# Patient Record
Sex: FEMALE | Race: WHITE | Employment: OTHER | URBAN - METROPOLITAN AREA
[De-identification: names, ages, dates, MRNs, and addresses within clinical notes are randomized per-mention and may not be internally consistent; named-entity substitution may affect disease eponyms.]

---

## 2023-05-08 ENCOUNTER — APPOINTMENT (OUTPATIENT)
Dept: PRIMARY CARE | Facility: CLINIC | Age: 48
End: 2023-05-08
Payer: COMMERCIAL

## 2023-05-31 PROBLEM — R00.0 TACHYCARDIA: Status: ACTIVE | Noted: 2023-05-31

## 2023-05-31 PROBLEM — N39.0 UTI (URINARY TRACT INFECTION): Status: ACTIVE | Noted: 2023-05-31

## 2023-05-31 PROBLEM — R94.4 DECREASED GFR: Status: ACTIVE | Noted: 2023-05-31

## 2023-05-31 PROBLEM — F41.9 ANXIETY: Status: ACTIVE | Noted: 2023-05-31

## 2023-05-31 PROBLEM — N28.1 COMPLEX RENAL CYST: Status: ACTIVE | Noted: 2023-05-31

## 2023-05-31 PROBLEM — R10.11 RUQ ABDOMINAL PAIN: Status: ACTIVE | Noted: 2023-05-31

## 2023-05-31 RX ORDER — ETONOGESTREL AND ETHINYL ESTRADIOL VAGINAL RING .015; .12 MG/D; MG/D
1 RING VAGINAL
COMMUNITY
End: 2023-11-14 | Stop reason: SDUPTHER

## 2023-05-31 RX ORDER — DULOXETIN HYDROCHLORIDE 20 MG/1
20 CAPSULE, DELAYED RELEASE ORAL DAILY
COMMUNITY
Start: 2021-10-14 | End: 2023-06-01 | Stop reason: DRUGHIGH

## 2023-05-31 NOTE — PROGRESS NOTES
Subjective   Patient ID: Kimberly Lozada is a 47 y.o. female who presents for CPE    HPI   The patient reports continued chronic intermittent episodes of aching soreness in the right upper quadrant extending to the right flank over the past several months.  The patient is unsure as to the duration of each episode.  She still reports that the episodes have not been affected by oral intake, passage of flatus, or movement.  Recently, she reports that the episodes may be precipitated by anxiety.  She reports no associated symptoms.  She does report a decrease in the frequency and intensity of the episodes over the past several months.    The patient reports history of copious postnasal drip over the past 6 to 12 months.  She also reports a history of right-sided nasal congestion and copious rhinorrhea times years-unchanged over the past 6 to 12 months.  She reports no other associated symptoms.  The patient is status post rhinoplasty at age 20.    She does report a history of dry mouth and dry eyes since she was experiencing more anxiety in September 2021.  She does report an increase in the dry mouth at night.  She reports no associated symptoms.  She does report some improvement in dry mouth and dry eyes over the past several months.    The patient reports a history of variability in the consistency of stool over the past several years.  She reports that the stools can be either solid or soft solid.  She does report occasional episodes of rectal urgency over the past several years which has on occasion lead to fecal incontinence.  She reports no associated abdominal pain, nausea, vomiting, melena, hematochezia.    Patient does report history of intermittent episodes of binge eating times years.  She reports no associated symptoms.    The patient does report a history of intermittent episodes of aching pain located along the right side of the neck and right trapezius region x10 years.  She reports that the episodes  are precipitated by the patient turning her head to the left for an extended period of time.  She does report radiation of pain to the right occipital region.  No other associated symptoms.    The patient reports the presence of a mole in the mid back region over the past year.  She does report associated pain at the site when she scratches the area.  She reports no other associated symptoms.    She does report a history of generalized fatigue times years-unchanged over the past 6 to 12 months.  She reports no other associated symptoms..  The patient has been much less anxious over the past several months.  No other new complaints.  Review of Systems  All systems have been reviewed and are normal except as previously noted  Objective   There were no vitals taken for this visit.    Physical Exam  Head-palpation revealed no tenderness over the maxillary or frontal sinuses  Eyes-extraocular movements intact pupils equal and reactive to light ;fundi revealed good retinal color no hemorrhages or exudates  Ears-palpation of pinnas and traguses revealed no tenderness. External auditory canals not erythematous or swollen. TMs clear.      Nose-turbinates not erythematous or swollen; nasal septal deviation noted to the right  Mouth-posterior pharynx is not erythematous or swollen. Tonsillar pillars appeared normal no exudates  Neck no lymphadenopathy. Thyroid gland not enlarged. , No bruits  Breast-deferred-the patient is scheduled for mammogram in July 2023  Lungs-clear to auscultation bilaterally  Cardiac-rate normal rhythm regular no murmurs no JVD  Abdomen-soft nondistended normal active bowel sounds. Palpation revealed no tenderness or masses  Pulses 2+ bilaterally in the upper extremities;0 bilaterally in the lower extremities.  Extremities-no peripheral edema.  Musculoskeletal  Cervical spine-no erythema or swelling.  Full range of motion with pain noted on rotation to the left and to a lesser extent on bending to the  left.  Full range of motion with no pain noted in all other directions of motion.  Palpation revealed no tenderness or increase in warmth  Skin-there is a 3 mm x 3 mm fleshy papule located in the paraspinal area lower thoracic, upper lumbar region.  No necrotic tissue noted.  No drainage noted.  No surrounding erythema noted.  Palpation revealed no tenderness or increase in warmth  Neurologic  Mental status-alert and oriented x3   Cranial nerves-2 through 12 grossly intact, no visual field abnormalities  Motor-no pronator drift noted, strength-5/5 in all muscle groups tested, , no tremor noted.  No bradykinesia noted.  No rigidity noted.  Negative pull test  Sensory-Light touch sensation fully intact  Pinprick sensation fully intact  Vibratory sensation slightly diminished in the first toes bilaterally equally  Cerebellar-no truncal ataxia, good coordination finger-nose testing,, good coordination heel-to-shin testing, normal rapid alternating movements  Romberg negative, no abnormality in tandem gait  Reflexes-2+/4 bilaterally  Assessment/Plan     Assessment    Whitecoat hypertension  Copious postnasal drip-May be secondary to structural abnormality nasal septal deviation.  Allergic rhinitis, nonallergic rhinitis or possible contributing factors  Long history of right-sided nasal congestion, rhinorrhea-May be secondary to structural abnormality.  Allergic rhinitis and nonallergic rhinitis may be contributing factors.  Xerostomia, dry eyes-May be secondary to Sjogren's syndrome  Long history of variability in the consistency of stool with occasional rectal urgency and fecal incontinence-May be secondary IBS-D.  The patient did have a colonoscopy in January 2022 which was unremarkable..  Chronic intermittent episodes of aching soreness in the right upper quadrant extending to the right flank-unsure of etiology.  May be secondary to neuromuscular pain, anxiety.  Benign calyceal diverticulum upper pole right  kidney2/25/22  Urinary tract infections-recurrent  Generalized fatigue-  Anxiety, is a possible etiology.  Anemia and thyroid disease must be ruled out  Presence of a small fleshy papule located paraspinal area lower thoracic, upper lumbar regions-may represent a skin tag.  Long history of intermittent episodes of aching pain located along the right side of the neck extending to the right occipital region and right trapezius region-May be secondary to osteoarthritis and degenerative disc disease of the cervical spine, right-sided neuroforaminal stenosis cervical spine.  Anxiety  Plan  Obtain CBC differential, CMP, fasting lipid profile, TSH, vitamin D level, vitamin B12 level, Cystatin C.  Level, anti-SSA, anti-SSB antibodies today.  Obtain urinalysis and EKG today.  Obtain CT calcium score test to soon as possible  I have asked the patient to check her blood pressure on a home BP monitor every other day for the next month and to either call me with these values or send the values to me electronically.  We will refer the patient to a dermatologist for possible removal of the aforementioned presumed skin tag  I told the patient to begin use of Astelin spray 2 sprays to each nostril twice daily as needed for the copious postnasal drip  I told the patient to apply Voltaren gel to the right side of the neck, right trapezius region every 6 hours as needed.  I told the patient to increase her fiber intake is much as possible.  She will continue her current dosage of duloxetine.  Again, the patient will contact me with her condition in 1 month

## 2023-06-01 ENCOUNTER — OFFICE VISIT (OUTPATIENT)
Dept: PRIMARY CARE | Facility: CLINIC | Age: 48
End: 2023-06-01
Payer: COMMERCIAL

## 2023-06-01 ENCOUNTER — APPOINTMENT (OUTPATIENT)
Dept: LAB | Facility: LAB | Age: 48
End: 2023-06-01
Payer: COMMERCIAL

## 2023-06-01 VITALS
BODY MASS INDEX: 23.15 KG/M2 | DIASTOLIC BLOOD PRESSURE: 90 MMHG | SYSTOLIC BLOOD PRESSURE: 130 MMHG | HEART RATE: 76 BPM | WEIGHT: 147.8 LBS

## 2023-06-01 DIAGNOSIS — K11.7 XEROSTOMIA: ICD-10-CM

## 2023-06-01 DIAGNOSIS — R94.4 DECREASED GFR: ICD-10-CM

## 2023-06-01 DIAGNOSIS — R00.0 TACHYCARDIA: Primary | ICD-10-CM

## 2023-06-01 DIAGNOSIS — R09.82 POSTNASAL DRIP: ICD-10-CM

## 2023-06-01 PROCEDURE — 99214 OFFICE O/P EST MOD 30 MIN: CPT | Performed by: INTERNAL MEDICINE

## 2023-06-01 RX ORDER — AZELASTINE 1 MG/ML
2 SPRAY, METERED NASAL 2 TIMES DAILY
COMMUNITY
End: 2023-06-01 | Stop reason: SDUPTHER

## 2023-06-01 RX ORDER — DULOXETIN HYDROCHLORIDE 30 MG/1
30 CAPSULE, DELAYED RELEASE ORAL DAILY
Qty: 90 CAPSULE | Refills: 2 | Status: SHIPPED | OUTPATIENT
Start: 2023-06-01 | End: 2023-09-01

## 2023-06-01 RX ORDER — AZELASTINE 1 MG/ML
2 SPRAY, METERED NASAL 2 TIMES DAILY
Qty: 30 ML | Refills: 2 | Status: SHIPPED | OUTPATIENT
Start: 2023-06-01

## 2023-06-02 ENCOUNTER — LAB (OUTPATIENT)
Dept: LAB | Facility: LAB | Age: 48
End: 2023-06-02
Payer: COMMERCIAL

## 2023-06-02 DIAGNOSIS — R94.4 DECREASED GFR: ICD-10-CM

## 2023-06-02 DIAGNOSIS — K11.7 XEROSTOMIA: ICD-10-CM

## 2023-06-02 DIAGNOSIS — R00.0 TACHYCARDIA: ICD-10-CM

## 2023-06-02 LAB
ALANINE AMINOTRANSFERASE (SGPT) (U/L) IN SER/PLAS: 9 U/L (ref 7–45)
ALBUMIN (G/DL) IN SER/PLAS: 4 G/DL (ref 3.4–5)
ALKALINE PHOSPHATASE (U/L) IN SER/PLAS: 45 U/L (ref 33–110)
ANION GAP IN SER/PLAS: 11 MMOL/L (ref 10–20)
ANTI-SSA: <0.2 AI
ANTI-SSB: <0.2 AI
ASPARTATE AMINOTRANSFERASE (SGOT) (U/L) IN SER/PLAS: 17 U/L (ref 9–39)
BASOPHILS (10*3/UL) IN BLOOD BY AUTOMATED COUNT: 0.01 X10E9/L (ref 0–0.1)
BASOPHILS/100 LEUKOCYTES IN BLOOD BY AUTOMATED COUNT: 0.2 % (ref 0–2)
BILIRUBIN TOTAL (MG/DL) IN SER/PLAS: 0.7 MG/DL (ref 0–1.2)
C REACTIVE PROTEIN (MG/L) IN SER/PLAS BY HIGH SENSIT: 2.4 MG/L
CALCIDIOL (25 OH VITAMIN D3) (NG/ML) IN SER/PLAS: 20 NG/ML
CALCIUM (MG/DL) IN SER/PLAS: 9.2 MG/DL (ref 8.6–10.6)
CARBON DIOXIDE, TOTAL (MMOL/L) IN SER/PLAS: 26 MMOL/L (ref 21–32)
CHLORIDE (MMOL/L) IN SER/PLAS: 107 MMOL/L (ref 98–107)
CHOLESTEROL (MG/DL) IN SER/PLAS: 172 MG/DL (ref 0–199)
CHOLESTEROL IN HDL (MG/DL) IN SER/PLAS: 103.4 MG/DL
CHOLESTEROL/HDL RATIO: 1.7
COBALAMIN (VITAMIN B12) (PG/ML) IN SER/PLAS: 390 PG/ML (ref 211–911)
CREATININE (MG/DL) IN SER/PLAS: 1.12 MG/DL (ref 0.5–1.05)
EOSINOPHILS (10*3/UL) IN BLOOD BY AUTOMATED COUNT: 0.14 X10E9/L (ref 0–0.7)
EOSINOPHILS/100 LEUKOCYTES IN BLOOD BY AUTOMATED COUNT: 2.7 % (ref 0–6)
ERYTHROCYTE DISTRIBUTION WIDTH (RATIO) BY AUTOMATED COUNT: 11.9 % (ref 11.5–14.5)
ERYTHROCYTE MEAN CORPUSCULAR HEMOGLOBIN CONCENTRATION (G/DL) BY AUTOMATED: 35.1 G/DL (ref 32–36)
ERYTHROCYTE MEAN CORPUSCULAR VOLUME (FL) BY AUTOMATED COUNT: 85 FL (ref 80–100)
ERYTHROCYTES (10*6/UL) IN BLOOD BY AUTOMATED COUNT: 5 X10E12/L (ref 4–5.2)
GFR FEMALE: 60 ML/MIN/1.73M2
GLUCOSE (MG/DL) IN SER/PLAS: 84 MG/DL (ref 74–99)
HEMATOCRIT (%) IN BLOOD BY AUTOMATED COUNT: 42.7 % (ref 36–46)
HEMOGLOBIN (G/DL) IN BLOOD: 15 G/DL (ref 12–16)
IMMATURE GRANULOCYTES/100 LEUKOCYTES IN BLOOD BY AUTOMATED COUNT: 0.2 % (ref 0–0.9)
LDL: 52 MG/DL (ref 0–99)
LEUKOCYTES (10*3/UL) IN BLOOD BY AUTOMATED COUNT: 5.2 X10E9/L (ref 4.4–11.3)
LYMPHOCYTES (10*3/UL) IN BLOOD BY AUTOMATED COUNT: 1.86 X10E9/L (ref 1.2–4.8)
LYMPHOCYTES/100 LEUKOCYTES IN BLOOD BY AUTOMATED COUNT: 36 % (ref 13–44)
MONOCYTES (10*3/UL) IN BLOOD BY AUTOMATED COUNT: 0.36 X10E9/L (ref 0.1–1)
MONOCYTES/100 LEUKOCYTES IN BLOOD BY AUTOMATED COUNT: 7 % (ref 2–10)
NEUTROPHILS (10*3/UL) IN BLOOD BY AUTOMATED COUNT: 2.78 X10E9/L (ref 1.2–7.7)
NEUTROPHILS/100 LEUKOCYTES IN BLOOD BY AUTOMATED COUNT: 53.9 % (ref 40–80)
NRBC (PER 100 WBCS) BY AUTOMATED COUNT: 0 /100 WBC (ref 0–0)
PLATELETS (10*3/UL) IN BLOOD AUTOMATED COUNT: 286 X10E9/L (ref 150–450)
POTASSIUM (MMOL/L) IN SER/PLAS: 4.3 MMOL/L (ref 3.5–5.3)
PROTEIN TOTAL: 6.6 G/DL (ref 6.4–8.2)
SODIUM (MMOL/L) IN SER/PLAS: 140 MMOL/L (ref 136–145)
THYROTROPIN (MIU/L) IN SER/PLAS BY DETECTION LIMIT <= 0.05 MIU/L: 2.3 MIU/L (ref 0.44–3.98)
TRIGLYCERIDE (MG/DL) IN SER/PLAS: 83 MG/DL (ref 0–149)
UREA NITROGEN (MG/DL) IN SER/PLAS: 15 MG/DL (ref 6–23)
VLDL: 17 MG/DL (ref 0–40)

## 2023-06-02 PROCEDURE — 82306 VITAMIN D 25 HYDROXY: CPT

## 2023-06-02 PROCEDURE — 80053 COMPREHEN METABOLIC PANEL: CPT

## 2023-06-02 PROCEDURE — 80061 LIPID PANEL: CPT

## 2023-06-02 PROCEDURE — 85025 COMPLETE CBC W/AUTO DIFF WBC: CPT

## 2023-06-02 PROCEDURE — 82610 CYSTATIN C: CPT

## 2023-06-02 PROCEDURE — 84443 ASSAY THYROID STIM HORMONE: CPT

## 2023-06-02 PROCEDURE — 36415 COLL VENOUS BLD VENIPUNCTURE: CPT

## 2023-06-02 PROCEDURE — 86141 C-REACTIVE PROTEIN HS: CPT

## 2023-06-02 PROCEDURE — 82607 VITAMIN B-12: CPT

## 2023-06-02 PROCEDURE — 86235 NUCLEAR ANTIGEN ANTIBODY: CPT

## 2023-06-05 LAB
CYSTATIN C: 0.84 MG/L (ref 0.63–1.03)
EGFR BY CYSTATIN C: 96 ML/MIN/BSA

## 2023-08-31 DIAGNOSIS — F41.9 ANXIETY: ICD-10-CM

## 2023-09-01 RX ORDER — DULOXETIN HYDROCHLORIDE 30 MG/1
30 CAPSULE, DELAYED RELEASE ORAL DAILY
Qty: 90 CAPSULE | Refills: 3 | Status: SHIPPED | OUTPATIENT
Start: 2023-09-01 | End: 2024-06-03 | Stop reason: SDUPTHER

## 2023-10-12 ENCOUNTER — TELEPHONE (OUTPATIENT)
Dept: PRIMARY CARE | Facility: CLINIC | Age: 48
End: 2023-10-12
Payer: COMMERCIAL

## 2023-10-12 NOTE — TELEPHONE ENCOUNTER
Patient is calling states that DOS 06/1/23 was coded incorrectly, wants to speak with you. Please call

## 2023-11-14 ENCOUNTER — OFFICE VISIT (OUTPATIENT)
Dept: OBSTETRICS AND GYNECOLOGY | Facility: CLINIC | Age: 48
End: 2023-11-14
Payer: COMMERCIAL

## 2023-11-14 VITALS
SYSTOLIC BLOOD PRESSURE: 148 MMHG | BODY MASS INDEX: 23.34 KG/M2 | WEIGHT: 154 LBS | DIASTOLIC BLOOD PRESSURE: 90 MMHG | HEIGHT: 68 IN

## 2023-11-14 DIAGNOSIS — Z01.419 ENCOUNTER FOR GYNECOLOGICAL EXAMINATION WITHOUT ABNORMAL FINDING: ICD-10-CM

## 2023-11-14 DIAGNOSIS — Z30.44 ENCOUNTER FOR SURVEILLANCE OF VAGINAL RING HORMONAL CONTRACEPTIVE DEVICE: Primary | ICD-10-CM

## 2023-11-14 PROCEDURE — 88175 CYTOPATH C/V AUTO FLUID REDO: CPT

## 2023-11-14 PROCEDURE — 87624 HPV HI-RISK TYP POOLED RSLT: CPT

## 2023-11-14 PROCEDURE — 99396 PREV VISIT EST AGE 40-64: CPT | Performed by: OBSTETRICS & GYNECOLOGY

## 2023-11-14 RX ORDER — ETONOGESTREL AND ETHINYL ESTRADIOL VAGINAL RING .015; .12 MG/D; MG/D
1 RING VAGINAL
Qty: 3 EACH | Refills: 4 | Status: SHIPPED | OUTPATIENT
Start: 2023-11-14 | End: 2024-06-03 | Stop reason: SDUPTHER

## 2023-11-14 NOTE — PROGRESS NOTES
Subjective   Kimberly Lozada is a 48 y.o. female here for a routine exam. Current complaints:.  She has a history of LEEP biopsy, her last Pap in November 2022 was cytology negative, high risk HPV positive.  We discussed mildly elevated blood pressure which she may monitor at home and discussed with her PCP. personal health questionnaire reviewed: not asked.     Gynecologic History  Patient's last menstrual period was 10/31/2023.  Contraception: NuvaRing vaginal inserts  Last Pap: 11/7/22. Results were: abnormal  Last mammogram: 7/19/23. Results were: normal    Obstetric History  OB History   No obstetric history on file.       Objective   Constitutional: Alert and in no acute distress. Well developed, well nourished.   Head and Face: Head and face: Normal.    Eyes: Normal external exam - nonicteric sclera, extraocular movements intact (EOMI) and no ptosis.   Neck: No neck asymmetry. Supple. Thyroid not enlarged and there were no palpable thyroid nodules.    Pulmonary: No respiratory distress.   Chest: Breasts: Normal appearance, no nipple discharge and no skin changes. Palpation of breasts and axillae: No palpable mass and no axillary lymphadenopathy.   Abdomen: Soft nontender; no abdominal mass palpated. No organomegaly. No hernias.   Genitourinary: External genitalia: Normal. No inguinal lymphadenopathy. Bartholin's Urethral and Skenes Glands: Normal. Urethra: Normal.  Bladder: Normal on palpation. Vagina: Normal. Cervix: Normal.  A pap was sent. Uterus: Normal.  Right Adnexa/parametria: Normal.  Left Adnexa/parametria: Normal.  Inspection of Perianal Area: Normal.   Musculoskeletal: No joint swelling seen, normal movements of all extremities.   Skin: Normal skin color and pigmentation, normal skin turgor, and no rash.   Neurologic: Non-focal. Grossly intact.   Psychiatric: Alert and oriented x 3. Affect normal to patient baseline. Mood: Appropriate.  Physical Exam     Assessment/Plan   Healthy female  exam.  This is a 48-year-old female with a normal exam.  A Pap smear was sent.  She has regular cycles on her NuvaRing and this was refilled.  I did mention to monitor her blood pressure as we may need to switch to an estrogen free option.    Her routine mammogram was completed in July 2023 and ordered for 2024.  She does have family history of colon cancer in her mother, she is current on her colonoscopy.  I will see her in 1 year.  Mammogram ordered.

## 2023-12-04 LAB
CYTOLOGY CMNT CVX/VAG CYTO-IMP: NORMAL
HPV HR 12 DNA GENITAL QL NAA+PROBE: POSITIVE
HPV HR GENOTYPES PNL CVX NAA+PROBE: POSITIVE
HPV16 DNA SPEC QL NAA+PROBE: NEGATIVE
HPV18 DNA SPEC QL NAA+PROBE: NEGATIVE
LAB AP HPV GENOTYPE QUESTION: YES
LAB AP HPV HR: NORMAL
LAB AP PREVIOUS ABNORMAL HISTORY: NORMAL
LABORATORY COMMENT REPORT: NORMAL
LMP START DATE: NORMAL
PATH REPORT.TOTAL CANCER: NORMAL

## 2023-12-05 NOTE — RESULT ENCOUNTER NOTE
Your Pap smear was normal.  The high risk HPV testing was still positive for the high risk type,  but negative for type 16 and 18.  Since the cytology is negative, we can repeat the Pap smear in 1 year with your annual exam.

## 2024-05-21 DIAGNOSIS — Z00.00 ROUTINE GENERAL MEDICAL EXAMINATION AT A HEALTH CARE FACILITY: ICD-10-CM

## 2024-05-21 DIAGNOSIS — R00.0 TACHYCARDIA: Primary | ICD-10-CM

## 2024-05-31 ENCOUNTER — LAB (OUTPATIENT)
Dept: LAB | Facility: LAB | Age: 49
End: 2024-05-31
Payer: COMMERCIAL

## 2024-05-31 DIAGNOSIS — H04.123 DRY EYES: ICD-10-CM

## 2024-05-31 DIAGNOSIS — Z00.00 ROUTINE GENERAL MEDICAL EXAMINATION AT A HEALTH CARE FACILITY: ICD-10-CM

## 2024-05-31 DIAGNOSIS — K11.7 XEROSTOMIA: ICD-10-CM

## 2024-05-31 DIAGNOSIS — R00.0 TACHYCARDIA: ICD-10-CM

## 2024-05-31 LAB
25(OH)D3 SERPL-MCNC: 47 NG/ML (ref 30–100)
ALBUMIN SERPL BCP-MCNC: 4 G/DL (ref 3.4–5)
ALP SERPL-CCNC: 44 U/L (ref 33–110)
ALT SERPL W P-5'-P-CCNC: 16 U/L (ref 7–45)
ANION GAP SERPL CALC-SCNC: 13 MMOL/L (ref 10–20)
AST SERPL W P-5'-P-CCNC: 20 U/L (ref 9–39)
BASOPHILS # BLD AUTO: 0.03 X10*3/UL (ref 0–0.1)
BASOPHILS NFR BLD AUTO: 0.6 %
BILIRUB SERPL-MCNC: 0.6 MG/DL (ref 0–1.2)
BUN SERPL-MCNC: 19 MG/DL (ref 6–23)
CALCIUM SERPL-MCNC: 9 MG/DL (ref 8.6–10.6)
CHLORIDE SERPL-SCNC: 103 MMOL/L (ref 98–107)
CHOLEST SERPL-MCNC: 173 MG/DL (ref 0–199)
CHOLESTEROL/HDL RATIO: 1.8
CO2 SERPL-SCNC: 27 MMOL/L (ref 21–32)
CREAT SERPL-MCNC: 1.06 MG/DL (ref 0.5–1.05)
CRP SERPL HS-MCNC: 2.2 MG/L
EGFRCR SERPLBLD CKD-EPI 2021: 65 ML/MIN/1.73M*2
EOSINOPHIL # BLD AUTO: 0.28 X10*3/UL (ref 0–0.7)
EOSINOPHIL NFR BLD AUTO: 5.2 %
ERYTHROCYTE [DISTWIDTH] IN BLOOD BY AUTOMATED COUNT: 11.9 % (ref 11.5–14.5)
GLUCOSE SERPL-MCNC: 76 MG/DL (ref 74–99)
HCT VFR BLD AUTO: 41.4 % (ref 36–46)
HDLC SERPL-MCNC: 97.1 MG/DL
HGB BLD-MCNC: 14.9 G/DL (ref 12–16)
IMM GRANULOCYTES # BLD AUTO: 0 X10*3/UL (ref 0–0.7)
IMM GRANULOCYTES NFR BLD AUTO: 0 % (ref 0–0.9)
LDLC SERPL CALC-MCNC: 58 MG/DL
LYMPHOCYTES # BLD AUTO: 2.49 X10*3/UL (ref 1.2–4.8)
LYMPHOCYTES NFR BLD AUTO: 46.6 %
MCH RBC QN AUTO: 30.2 PG (ref 26–34)
MCHC RBC AUTO-ENTMCNC: 36 G/DL (ref 32–36)
MCV RBC AUTO: 84 FL (ref 80–100)
MONOCYTES # BLD AUTO: 0.52 X10*3/UL (ref 0.1–1)
MONOCYTES NFR BLD AUTO: 9.7 %
NEUTROPHILS # BLD AUTO: 2.02 X10*3/UL (ref 1.2–7.7)
NEUTROPHILS NFR BLD AUTO: 37.9 %
NON HDL CHOLESTEROL: 76 MG/DL (ref 0–149)
NRBC BLD-RTO: 0 /100 WBCS (ref 0–0)
PLATELET # BLD AUTO: 305 X10*3/UL (ref 150–450)
POTASSIUM SERPL-SCNC: 4.3 MMOL/L (ref 3.5–5.3)
PROT SERPL-MCNC: 6.5 G/DL (ref 6.4–8.2)
RBC # BLD AUTO: 4.94 X10*6/UL (ref 4–5.2)
SODIUM SERPL-SCNC: 139 MMOL/L (ref 136–145)
TRIGL SERPL-MCNC: 89 MG/DL (ref 0–149)
TSH SERPL-ACNC: 4.23 MIU/L (ref 0.44–3.98)
VIT B12 SERPL-MCNC: 494 PG/ML (ref 211–911)
VLDL: 18 MG/DL (ref 0–40)
WBC # BLD AUTO: 5.3 X10*3/UL (ref 4.4–11.3)

## 2024-05-31 PROCEDURE — 80061 LIPID PANEL: CPT

## 2024-05-31 PROCEDURE — 80053 COMPREHEN METABOLIC PANEL: CPT

## 2024-05-31 PROCEDURE — 82306 VITAMIN D 25 HYDROXY: CPT

## 2024-05-31 PROCEDURE — 82607 VITAMIN B-12: CPT

## 2024-05-31 PROCEDURE — 86235 NUCLEAR ANTIGEN ANTIBODY: CPT

## 2024-05-31 PROCEDURE — 36415 COLL VENOUS BLD VENIPUNCTURE: CPT

## 2024-05-31 PROCEDURE — 85025 COMPLETE CBC W/AUTO DIFF WBC: CPT

## 2024-05-31 PROCEDURE — 86141 C-REACTIVE PROTEIN HS: CPT

## 2024-05-31 PROCEDURE — 84443 ASSAY THYROID STIM HORMONE: CPT

## 2024-05-31 PROCEDURE — 82610 CYSTATIN C: CPT

## 2024-06-01 PROBLEM — H04.123 DRY EYES, BILATERAL: Status: ACTIVE | Noted: 2024-06-01

## 2024-06-01 PROBLEM — K11.7 XEROSTOMIA: Status: ACTIVE | Noted: 2024-06-01

## 2024-06-01 LAB
CYSTATIN C SERPL-MCNC: 0.9 MG/L (ref 0.5–1.2)
GFR/BSA.PRED SERPLBLD CYS-BASED-ARV: 87 ML/MIN/BSA

## 2024-06-02 LAB
ENA SS-A AB SER IA-ACNC: <0.2 AI
ENA SS-B AB SER IA-ACNC: <0.2 AI

## 2024-06-02 NOTE — PROGRESS NOTES
Subjective   Patient ID: Kimberly Lozada is a 48 y.o. female who presents for CPE    HPI   Labs obtained May 31, 2024 were reviewed.  The patient reports a long history of occasional urinary urgency times years.  She does report that infrequently she may experience urinary incontinence associated with the urgency.  She reports no other associated symptoms.    She also reports a history of intermittent episodes of tightness in the right groin region times years.  She reports that the episodes of been precipitated by increased activity..  She also reports that the episodes may be precipitated by the patient carrying heavy objects on her right side.  She reports no radiation of discomfort..  She reports no preceding trauma/overexertion.  No other associated symptoms.    Over the past year, the patient reports experiencing late night and early morning dry mouth..  She attributes this this symptom to the fact that she sleeps with her mouth open.  She has experienced no episodes of dry eyes.  No other associated symptoms..    Over the past year, the patient reports passage of more uniform consistency of stool.  Over the past year, she reports only rare episodes of rectal urgency and fecal incontinence.  No other associated symptoms.    Over the past year, she reports no episodes of right upper quadrant and right flank pain.    She does report that over the past year her energy level has improved.  No other new complaints.  Note that the patient has not been using Astelin spray at all.  Review of Systems  All systems have been reviewed and are normal except as previously noted  Objective   There were no vitals taken for this visit.    Physical Exam  Head-palpation revealed no tenderness over the maxillary or frontal sinuses  Eyes-extraocular movements intact pupils equal and reactive to light ;fundi not well-visualized  Ears-palpation of pinnas and traguses revealed no tenderness. External auditory canals not erythematous  or swollen. TMs clear.      Nose-turbinates not erythematous or swollen; nasal septal deviation noted to the right  Mouth-posterior pharynx is not erythematous or swollen. Tonsillar pillars appeared normal no exudates  Neck no lymphadenopathy. Thyroid gland is mildly enlarged.. , No bruits  Breast-deferred-the patient is scheduled for mammogram in July 2024  Lungs-clear to auscultation bilaterally  Cardiac-rate normal rhythm regular no murmurs no JVD  Abdomen-soft nondistended normal active bowel sounds. Palpation revealed no tenderness or masses  Pulses 2+ bilaterally in the upper extremities;0 bilaterally in the lower extremities.  Extremities-no peripheral edema.  Musculoskeletal  Cervical spine-no erythema or swelling.  Full range of motion with pain noted on rotation to the left and to a lesser extent on bending to the left.  Full range of motion with no pain noted in all other directions of motion.  Palpation revealed no tenderness or increase in warmth  Right hip-no erythema or swelling.  Windshield wiper test negative.  Full range of motion in all directions of motion with no pain.  Palpation revealed no tenderness or increase in warmth  Skin-there is a 3 mm x 3 mm fleshy papule located in the paraspinal area lower thoracic, upper lumbar region.  No necrotic tissue noted.  No drainage noted.  No surrounding erythema noted.  Palpation revealed no tenderness or increase in warmth  Neurologic  Mental status-alert and oriented x3   Cranial nerves-2 through 12 grossly intact, no visual field abnormalities  Motor-no pronator drift noted, strength-5/5 in all muscle groups tested, , no tremor noted.  No bradykinesia noted.  No rigidity noted.  Negative pull test  Sensory-Light touch sensation fully intact  Pinprick sensation fully intact  Vibratory sensation slightly diminished in the first toes bilaterally equally  Cerebellar-no truncal ataxia, good coordination finger-nose testing,, good coordination heel-to-shin  testing, normal rapid alternating movements  Romberg negative, no abnormality in tandem gait  Reflexes-2+/4 bilaterally  Assessment/Plan   Problem List Items Addressed This Visit             ICD-10-CM    Anxiety F41.9    Relevant Medications    DULoxetine (Cymbalta) 30 mg DR capsule    Tachycardia R00.0    Xerostomia K11.7    Relevant Orders    Anti-SSA (Completed)    Anti-SSB (Completed)     Other Visit Diagnoses         Codes    Postnasal drip    -  Primary R09.82    Dry eyes     H04.123    Relevant Orders    Anti-SSA (Completed)    Anti-SSB (Completed)    Family history of coronary artery disease     Z82.49    Relevant Orders    CT cardiac scoring wo IV contrast    Encounter for surveillance of vaginal ring hormonal contraceptive device     Z30.44    Relevant Medications    etonogestreL-ethinyl estradioL (Nuvaring) 0.12-0.015 mg/24 hr vaginal ring             Whitecoat hypertension  Chronic copious postnasal drip-May be secondary to structural abnormality nasal septal deviation.  Allergic rhinitis, nonallergic rhinitis or possible contributing factors  Chronic right-sided nasal congestion, rhinorrhea-May be secondary to structural abnormality.  Allergic rhinitis and nonallergic rhinitis may be contributing factors.  Chronic late night and AM xerostomia-unsure of etiology.  May be secondary to the patient sleeping with her mouth open  Chronic variability in the consistency of stool with occasional rectal urgency and fecal incontinence-May be secondary IBS-D.  The patient did have a colonoscopy in January 2022 which was unremarkable.  Long history of occasional urinary urgency-May be secondary to overactive bladder  Benign calyceal diverticulum upper pole right kidney2/25/22  Urinary tract infections-recurrent.  Intermittent episodes of tightness in the right groin region-May be secondary to muscle strain, osteoarthritis of the right hip-unlikely  Slightly elevated TSH-May be secondary to subclinical  hypothyroidism  Chronic presence of a small fleshy papule located paraspinal area lower thoracic, upper lumbar regions-may represent a skin tag.  Chronic intermittent episodes of binge eating-unsure of etiology.  May be secondary to eating disorder  Chronic intermittent episodes of aching pain located along the right side of the neck extending to the right occipital region and right trapezius region-May be secondary to osteoarthritis and degenerative disc disease of the cervical spine, right-sided neuroforaminal stenosis cervical spine.  Anxiety    Plan  Obtain EKG and urinalysis today.  I again recommended that the patient begin use of Astelin spray 2 sprays to each nostril twice daily as needed  I told the patient that she could apply Voltaren gel to the right side of the neck, right trapezius region as needed for that she could take Tylenol 1000 mg p.o. every 6 hours as needed.  I recommended that the patient return for a follow-up TSH in 6 months and a complete physical examination in 1 year

## 2024-06-03 ENCOUNTER — OFFICE VISIT (OUTPATIENT)
Dept: PRIMARY CARE | Facility: CLINIC | Age: 49
End: 2024-06-03
Payer: COMMERCIAL

## 2024-06-03 VITALS
HEART RATE: 76 BPM | BODY MASS INDEX: 23.69 KG/M2 | WEIGHT: 155.8 LBS | DIASTOLIC BLOOD PRESSURE: 76 MMHG | SYSTOLIC BLOOD PRESSURE: 106 MMHG

## 2024-06-03 DIAGNOSIS — K11.7 XEROSTOMIA: ICD-10-CM

## 2024-06-03 DIAGNOSIS — R00.0 TACHYCARDIA: ICD-10-CM

## 2024-06-03 DIAGNOSIS — Z82.49 FAMILY HISTORY OF CORONARY ARTERY DISEASE: ICD-10-CM

## 2024-06-03 DIAGNOSIS — Z00.00 ROUTINE GENERAL MEDICAL EXAMINATION AT A HEALTH CARE FACILITY: Primary | ICD-10-CM

## 2024-06-03 DIAGNOSIS — H04.123 DRY EYES: ICD-10-CM

## 2024-06-03 DIAGNOSIS — R09.82 POSTNASAL DRIP: ICD-10-CM

## 2024-06-03 DIAGNOSIS — Z30.44 ENCOUNTER FOR SURVEILLANCE OF VAGINAL RING HORMONAL CONTRACEPTIVE DEVICE: ICD-10-CM

## 2024-06-03 DIAGNOSIS — F41.9 ANXIETY: ICD-10-CM

## 2024-06-03 LAB
POC APPEARANCE, URINE: CLEAR
POC BILIRUBIN, URINE: NEGATIVE
POC BLOOD, URINE: ABNORMAL
POC COLOR, URINE: ABNORMAL
POC GLUCOSE, URINE: NEGATIVE MG/DL
POC KETONES, URINE: NEGATIVE MG/DL
POC LEUKOCYTES, URINE: ABNORMAL
POC NITRITE,URINE: NEGATIVE
POC PH, URINE: 6 PH
POC PROTEIN, URINE: NEGATIVE MG/DL
POC SPECIFIC GRAVITY, URINE: 1.01
POC UROBILINOGEN, URINE: 0.2 EU/DL

## 2024-06-03 PROCEDURE — 81002 URINALYSIS NONAUTO W/O SCOPE: CPT | Performed by: INTERNAL MEDICINE

## 2024-06-03 PROCEDURE — 99213 OFFICE O/P EST LOW 20 MIN: CPT | Performed by: INTERNAL MEDICINE

## 2024-06-03 PROCEDURE — 93000 ELECTROCARDIOGRAM COMPLETE: CPT | Performed by: INTERNAL MEDICINE

## 2024-06-03 PROCEDURE — 99396 PREV VISIT EST AGE 40-64: CPT | Performed by: INTERNAL MEDICINE

## 2024-06-03 RX ORDER — ETONOGESTREL AND ETHINYL ESTRADIOL VAGINAL RING .015; .12 MG/D; MG/D
1 RING VAGINAL
Qty: 3 EACH | Refills: 4 | Status: SHIPPED | OUTPATIENT
Start: 2024-06-03

## 2024-06-03 RX ORDER — DULOXETIN HYDROCHLORIDE 30 MG/1
30 CAPSULE, DELAYED RELEASE ORAL DAILY
Qty: 90 CAPSULE | Refills: 3 | Status: SHIPPED | OUTPATIENT
Start: 2024-06-03

## 2024-06-03 RX ORDER — CHOLECALCIFEROL (VITAMIN D3) 50 MCG
100 TABLET ORAL DAILY
COMMUNITY

## 2024-06-04 LAB
BACTERIA #/AREA URNS AUTO: ABNORMAL /HPF
RBC #/AREA URNS AUTO: ABNORMAL /HPF
WBC #/AREA URNS AUTO: ABNORMAL /HPF

## 2024-06-04 PROCEDURE — 87086 URINE CULTURE/COLONY COUNT: CPT

## 2024-06-04 PROCEDURE — 81001 URINALYSIS AUTO W/SCOPE: CPT

## 2024-06-06 ENCOUNTER — TELEPHONE (OUTPATIENT)
Dept: PRIMARY CARE | Facility: CLINIC | Age: 49
End: 2024-06-06
Payer: COMMERCIAL

## 2024-06-06 LAB — BACTERIA UR CULT: NORMAL

## 2024-06-06 NOTE — TELEPHONE ENCOUNTER
Urinalysis revealed pyuria and microscopic hematuria, culture was negative.  I will asked the patient to return for a routine urinalysis in 8 weeks.  She did have a thorough urological evaluation in early 2022

## 2024-07-14 ENCOUNTER — TELEPHONE (OUTPATIENT)
Dept: PRIMARY CARE | Facility: CLINIC | Age: 49
End: 2024-07-14
Payer: COMMERCIAL
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
## 2024-07-14 RX ORDER — DOXYCYCLINE 100 MG/1
200 CAPSULE ORAL ONCE
Qty: 2 CAPSULE | Refills: 0 | Status: SHIPPED | OUTPATIENT
Start: 2024-07-14 | End: 2024-07-14

## 2024-07-14 NOTE — TELEPHONE ENCOUNTER
Patient sent me a message today that she had a tick bite and so I have prescribed doxycycline  200 mg p.o. x 1 dose.

## 2024-07-19 ENCOUNTER — HOSPITAL ENCOUNTER (OUTPATIENT)
Dept: RADIOLOGY | Facility: CLINIC | Age: 49
Discharge: HOME | End: 2024-07-19
Payer: COMMERCIAL

## 2024-07-19 VITALS — HEIGHT: 68 IN | BODY MASS INDEX: 22.73 KG/M2 | WEIGHT: 150 LBS

## 2024-07-19 DIAGNOSIS — Z01.419 ENCOUNTER FOR GYNECOLOGICAL EXAMINATION WITHOUT ABNORMAL FINDING: ICD-10-CM

## 2024-07-19 PROCEDURE — 77067 SCR MAMMO BI INCL CAD: CPT

## 2024-11-19 ENCOUNTER — APPOINTMENT (OUTPATIENT)
Dept: OBSTETRICS AND GYNECOLOGY | Facility: CLINIC | Age: 49
End: 2024-11-19
Payer: COMMERCIAL

## 2024-11-19 VITALS
BODY MASS INDEX: 23.64 KG/M2 | HEIGHT: 68 IN | DIASTOLIC BLOOD PRESSURE: 84 MMHG | WEIGHT: 156 LBS | HEART RATE: 69 BPM | SYSTOLIC BLOOD PRESSURE: 123 MMHG

## 2024-11-19 DIAGNOSIS — Z01.419 ENCOUNTER FOR GYNECOLOGICAL EXAMINATION WITHOUT ABNORMAL FINDING: Primary | ICD-10-CM

## 2024-11-19 DIAGNOSIS — Z30.44 ENCOUNTER FOR SURVEILLANCE OF VAGINAL RING HORMONAL CONTRACEPTIVE DEVICE: ICD-10-CM

## 2024-11-19 PROCEDURE — 87491 CHLMYD TRACH DNA AMP PROBE: CPT

## 2024-11-19 PROCEDURE — 88175 CYTOPATH C/V AUTO FLUID REDO: CPT

## 2024-11-19 PROCEDURE — 87591 N.GONORRHOEAE DNA AMP PROB: CPT

## 2024-11-19 PROCEDURE — 87661 TRICHOMONAS VAGINALIS AMPLIF: CPT

## 2024-11-19 PROCEDURE — 87624 HPV HI-RISK TYP POOLED RSLT: CPT

## 2024-11-19 PROCEDURE — 3008F BODY MASS INDEX DOCD: CPT | Performed by: OBSTETRICS & GYNECOLOGY

## 2024-11-19 PROCEDURE — 1036F TOBACCO NON-USER: CPT | Performed by: OBSTETRICS & GYNECOLOGY

## 2024-11-19 PROCEDURE — 99396 PREV VISIT EST AGE 40-64: CPT | Performed by: OBSTETRICS & GYNECOLOGY

## 2024-11-19 RX ORDER — ETONOGESTREL AND ETHINYL ESTRADIOL VAGINAL RING .015; .12 MG/D; MG/D
1 RING VAGINAL
Qty: 3 EACH | Refills: 4 | Status: SHIPPED | OUTPATIENT
Start: 2024-11-19

## 2024-11-19 NOTE — PROGRESS NOTES
Subjective   Kimberly Lozada is a 49 y.o. female here for a routine exam.  She has regular cycles on NuvaRing.  She has no discharge, no dysuria, no change in bowel habits.  She is current on her colonoscopy, there is family history of colon cancer in her mother.    She is single,dating, not sexually active currently.    She is positive HPV, we discussed Gardasil.    She works as a .  Personal health questionnaire reviewed: yes.     Gynecologic History  Patient's last menstrual period was 10/27/2024 (approximate).  Contraception: NuvaRing vaginal inserts  Last Pap: 23. Results were: normal  Last mammogram: 24. Results were: normal    Obstetric History  OB History    Para Term  AB Living   0 0 0         SAB IAB Ectopic Multiple Live Births                   Objective   Constitutional: Alert and in no acute distress. Well developed, well nourished.   Head and Face: Head and face: Normal.    Eyes: Normal external exam - nonicteric sclera, extraocular movements intact (EOMI) and no ptosis.   Neck: No neck asymmetry. Supple. Thyroid not enlarged and there were no palpable thyroid nodules.    Pulmonary: No respiratory distress.   Chest: Breasts: Normal appearance, no nipple discharge and no skin changes. Palpation of breasts and axillae: No palpable mass and no axillary lymphadenopathy.   Abdomen: Soft nontender; no abdominal mass palpated. No organomegaly. No hernias.   Genitourinary: External genitalia: Normal. No inguinal lymphadenopathy. Bartholin's Urethral and Skenes Glands: Normal. Urethra: Normal.  Bladder: Normal on palpation. Vagina: Normal. Cervix: Normal.  Uterus: Normal.  Right Adnexa/parametria: Normal.  Left Adnexa/parametria: Normal.  Inspection of Perianal Area: Normal.   Musculoskeletal: No joint swelling seen, normal movements of all extremities.   Skin: Normal skin color and pigmentation, normal skin turgor, and no rash.   Neurologic: Non-focal. Grossly intact.    Psychiatric: Alert and oriented x 3. Affect normal to patient baseline. Mood: Appropriate.  Physical Exam     Assessment/Plan   Healthy female exam.  This is a 49-year-old female with a normal exam.  A Pap smear was sent, including cultures for chlamydia, gonorrhea and trichomonas.    Her routine mammogram is due in July 2025.    A refill for NuvaRing was ordered to her [facility] pharmacy.  We discussed checking FSH for menopause at age 51.    I will see her routinely in 1 year, or sooner as needed.  Mammogram ordered.

## 2024-11-20 LAB
C TRACH RRNA SPEC QL NAA+PROBE: NEGATIVE
N GONORRHOEA DNA SPEC QL PROBE+SIG AMP: NEGATIVE
T VAGINALIS RRNA SPEC QL NAA+PROBE: NEGATIVE

## 2024-12-06 LAB
CYTOLOGY CMNT CVX/VAG CYTO-IMP: NORMAL
HPV HR 12 DNA GENITAL QL NAA+PROBE: NEGATIVE
HPV HR GENOTYPES PNL CVX NAA+PROBE: NEGATIVE
HPV16 DNA SPEC QL NAA+PROBE: NEGATIVE
HPV18 DNA SPEC QL NAA+PROBE: NEGATIVE
LAB AP CONTRACEPTIVE HISTORY: NORMAL
LAB AP HPV GENOTYPE QUESTION: YES
LAB AP HPV HR: NORMAL
LAB AP PAP ADDITIONAL TESTS: NORMAL
LAB AP PREVIOUS ABNORMAL HISTORY: NORMAL
LAB AP TREATMENT HISTORY: NORMAL
LABORATORY COMMENT REPORT: NORMAL
LMP START DATE: NORMAL
PATH REPORT.TOTAL CANCER: NORMAL

## 2025-02-04 ENCOUNTER — OFFICE VISIT (OUTPATIENT)
Dept: URGENT CARE | Age: 50
End: 2025-02-04
Payer: COMMERCIAL

## 2025-02-04 VITALS
SYSTOLIC BLOOD PRESSURE: 115 MMHG | HEART RATE: 98 BPM | OXYGEN SATURATION: 98 % | RESPIRATION RATE: 17 BRPM | TEMPERATURE: 99.1 F | DIASTOLIC BLOOD PRESSURE: 79 MMHG

## 2025-02-04 DIAGNOSIS — J01.90 ACUTE SINUSITIS, RECURRENCE NOT SPECIFIED, UNSPECIFIED LOCATION: Primary | ICD-10-CM

## 2025-02-04 DIAGNOSIS — R50.9 FEVER, UNSPECIFIED FEVER CAUSE: ICD-10-CM

## 2025-02-04 DIAGNOSIS — R52 BODY ACHES: ICD-10-CM

## 2025-02-04 LAB
POC RAPID INFLUENZA A: NEGATIVE
POC RAPID INFLUENZA B: NEGATIVE
POC SARS-COV-2 AG BINAX: NORMAL

## 2025-02-04 RX ORDER — DOXYCYCLINE 100 MG/1
100 CAPSULE ORAL 2 TIMES DAILY
Qty: 14 CAPSULE | Refills: 0 | Status: SHIPPED | OUTPATIENT
Start: 2025-02-04 | End: 2025-02-11

## 2025-02-05 NOTE — PROGRESS NOTES
Subjective   Patient ID: Kimberly Lozada is a 49 y.o. female. They present today with a chief complaint of Fever, Chills, Fatigue, and Generalized Body Aches (X sunday).    History of Present Illness  HPI    49-year-old with fever, chills, body aches, fatigue for 2 to 3 days.  She denies coughing.  She has chronic postnasal drainage and has felt that maybe her lymph nodes are more swollen.  Denies sore throat.    No nausea, vomiting, abdominal pain, diarrhea or dysuria.      Past Medical History  Allergies as of 02/04/2025 - Reviewed 11/19/2024   Allergen Reaction Noted    Escitalopram Hives 06/01/2023       (Not in a hospital admission)       Past Medical History:   Diagnosis Date    Other specified health status     No pertinent past medical history       Past Surgical History:   Procedure Laterality Date    OTHER SURGICAL HISTORY  10/08/2020    Rhinoplasty    OTHER SURGICAL HISTORY  01/04/2021    Loop electrosurgical excision procedure    OTHER SURGICAL HISTORY  01/04/2021    Urethra surgery        reports that she has never smoked. She has never used smokeless tobacco. She reports current alcohol use of about 2.0 - 3.0 standard drinks of alcohol per week. She reports that she does not use drugs.    Review of Systems  Review of Systems                               Objective    Vitals:    02/04/25 1903   BP: 115/79   BP Location: Left arm   Patient Position: Sitting   Pulse: 98   Resp: 17   Temp: 37.3 °C (99.1 °F)   SpO2: 98%     No LMP recorded.    Physical Exam      Constitutional: Vital signs reviewed. Well developed and well nourished. Patient alert and without distress.     Head and Face: Normal, no orbital swelling.     Eyes: Pupils and irises normal. Pink conjunctivae, anicteric sclerae. No conjunctival injection.    Ears, Nose, Mouth and Throat: External inspection of ears: Normal. Otoscopic exam: Normal. Nasal Mucosa, septum and turbinates: Nasal septal deviation noted with narrow passages, + red,  swollen turbinates with pussy discharge on the right. Oropharynx: +postnasal drainage. Normal oropharynx otherwise    Neck: No neck mass observed. Supple.    Cardiovascular: Heart rate normal, normal S1 and S2, no gallops, no murmurs and no pericardial rub. Rhythm: Normal. No peripheral edema.    Pulmonary: No respiratory distress. Clear breath sounds.  Negative egophony.    Neurologic: Cortical function: Normal    Lymphatic: No cervical lymphadenopathy      Procedures    Point of Care Test & Imaging Results from this visit  Results for orders placed or performed in visit on 02/04/25   POCT Influenza A/B manually resulted   Result Value Ref Range    POC Rapid Influenza A Negative Negative    POC Rapid Influenza B Negative Negative   POCT Covid-19 Rapid Antigen   Result Value Ref Range    POC SHERIE-COV-2 AG  Presumptive negative test for SARS-CoV-2 (no antigen detected)     Presumptive negative test for SARS-CoV-2 (no antigen detected)      No results found.    Diagnostic study results (if any) were reviewed by Beatris Diallo MD.    Assessment/Plan   Allergies, medications, history, and pertinent labs/EKGs/Imaging reviewed by Beatris Diallo MD.     Medical Decision Making  I suspect the source of her fever to be from acute sinusitis.  We will treat this.  Patient suspects she might have COVID-19 exposure so I recommended that she repeat a home COVID test in 48 hours.  Since symptom presentation is nonspecific, we discussed symptoms to watch and when to go to ED.  Patient's breast understanding and agrees.    Orders and Diagnoses  Diagnoses and all orders for this visit:  Acute sinusitis, recurrence not specified, unspecified location  -     doxycycline (Monodox) 100 mg capsule; Take 1 capsule (100 mg) by mouth 2 times a day for 7 days. Take with at least 8 ounces (large glass) of water, do not lie down for 30 minutes after  Fever, unspecified fever cause  -     POCT Influenza A/B manually  resulted  -     POCT Covid-19 Rapid Antigen  -     doxycycline (Monodox) 100 mg capsule; Take 1 capsule (100 mg) by mouth 2 times a day for 7 days. Take with at least 8 ounces (large glass) of water, do not lie down for 30 minutes after  Body aches  -     POCT Influenza A/B manually resulted  -     POCT Covid-19 Rapid Antigen      Medical Admin Record      Patient disposition: Home    Electronically signed by Beatris Diallo MD  7:43 PM

## 2025-02-05 NOTE — PATIENT INSTRUCTIONS
Go to the emergency department for severe symptoms.    Follow-up with primary care as needed.    Plain saline nasal spray, drops or wash every 2-4 hours to rinse the nasal passages followed with warm saltwater or mouthwash gargles.    Acetaminophen 500 mg (Extra StrengthTylenol), 2 tablets every 6 hours as needed for fever or pain.

## 2025-02-13 ENCOUNTER — OFFICE VISIT (OUTPATIENT)
Dept: PRIMARY CARE | Facility: CLINIC | Age: 50
End: 2025-02-13
Payer: COMMERCIAL

## 2025-02-13 VITALS
BODY MASS INDEX: 23.67 KG/M2 | TEMPERATURE: 97.8 F | SYSTOLIC BLOOD PRESSURE: 110 MMHG | HEIGHT: 68 IN | WEIGHT: 156.2 LBS | HEART RATE: 90 BPM | DIASTOLIC BLOOD PRESSURE: 76 MMHG

## 2025-02-13 DIAGNOSIS — J02.8 PHARYNGITIS DUE TO OTHER ORGANISM: ICD-10-CM

## 2025-02-13 DIAGNOSIS — R09.82 POSTNASAL DRIP: ICD-10-CM

## 2025-02-13 DIAGNOSIS — J03.90 TONSILLITIS: ICD-10-CM

## 2025-02-13 LAB — POC RAPID STREP: NEGATIVE

## 2025-02-13 PROCEDURE — 3008F BODY MASS INDEX DOCD: CPT | Performed by: INTERNAL MEDICINE

## 2025-02-13 PROCEDURE — 99214 OFFICE O/P EST MOD 30 MIN: CPT | Performed by: INTERNAL MEDICINE

## 2025-02-13 PROCEDURE — 87880 STREP A ASSAY W/OPTIC: CPT | Performed by: INTERNAL MEDICINE

## 2025-02-13 RX ORDER — FOLIC ACID 0.4 MG
0.4 TABLET ORAL DAILY
COMMUNITY

## 2025-02-13 RX ORDER — CHOLECALCIFEROL (VITAMIN D3) 50 MCG
1000 TABLET ORAL DAILY
Qty: 45 TABLET | Refills: 1 | Status: SHIPPED | OUTPATIENT
Start: 2025-02-13

## 2025-02-13 RX ORDER — AZELASTINE 1 MG/ML
2 SPRAY, METERED NASAL 2 TIMES DAILY
Qty: 30 ML | Refills: 2 | Status: SHIPPED | OUTPATIENT
Start: 2025-02-13

## 2025-02-13 NOTE — PROGRESS NOTES
Subjective   Patient ID: Kimberly Lozada is a 49 y.o. female who presents for white spots on throat    HPI   The patient reports a history of constant pain on both sides of the back of the throat x 2 days.  Since yesterday, she reports that she has noted white spots on the right and left sides of the back of the throat as well.  She reports continued chronic postnasal drip times years-unchanged over the past 9 days.  She reports no other associated symptoms.  The patient did complete her 7-day course of doxycycline 2 days ago  Review of Systems    Objective   There were no vitals taken for this visit.    Physical Exam  Nose-turbinates not erythematous or swollen; nasal septal deviation noted to the right  Mouth-posterior pharynx is mildly erythematous but not swollen.. Tonsillar pillars appeared normal; gray exudate noted on each tonsil  Neck-palpation did reveal enlarged nontender posterior cervical lymph nodes bilaterally. Thyroid gland is mildly enlarged.. , No bruits    Lungs-clear to auscultation bilaterally  Cardiac-rate normal rhythm regular no murmurs no JVD  Abdomen-soft nondistended normal active bowel sounds. Palpation revealed no tenderness or masses  Assessment/Plan   Problem List Items Addressed This Visit    None  Visit Diagnoses         Codes    Postnasal drip     R09.82    Relevant Medications    azelastine (Astelin) 137 mcg (0.1 %) nasal spray    Other Relevant Orders    POCT Rapid Strep A manually resulted (Completed)    Pharyngitis due to other organism     J02.8    Relevant Orders    Ivette-Barr virus VCA antibody panel    Cytomegalovirus antibody, IgM    Ivette-Barr PCR, Quant,Plasma    Tonsillitis     J03.90             Assessment  Sore throat/tonsillitis-May be secondary to viral pharyngitis EBV, CMV-latter 2 less likely  Chronic postnasal drip-May be secondary to deviated nasal septum  Plan  Obtain rapid strep test today.  If the rapid strep test is negative, I will send the patient for  an EBV panel, Ig M CMV antibody test.  I recommended that she begin use of Cepacol or Cepastat lozenges as directed.  I told her that she could take Tylenol 1000 mg every 4-6 hours as needed pain.  I told the patient to call me in 5 days with her condition or sooner if symptoms worsen.  I also recommended that the patient begin use of Astelin spray 2 sprays each nostril twice daily as needed.

## 2025-02-15 LAB
CMV IGM SERPL IA-ACNC: <30 AU/ML
EBV DNA # SPEC NAA+PROBE: ABNORMAL COPIES/ML
EBV DNA SPEC NAA+PROBE-LOG#: 4.01 LOG CPS/ML
EBV NA IGG SER IA-ACNC: <18 U/ML
EBV VCA IGG SER IA-ACNC: 34.2 U/ML
EBV VCA IGM SER IA-ACNC: >160 U/ML
QUEST EBV PANEL INTERPRETATION:: ABNORMAL
SPECIMEN SOURCE: ABNORMAL

## 2025-07-23 ENCOUNTER — APPOINTMENT (OUTPATIENT)
Dept: RADIOLOGY | Facility: CLINIC | Age: 50
End: 2025-07-23
Payer: COMMERCIAL

## 2025-07-23 VITALS — HEIGHT: 68 IN | BODY MASS INDEX: 23.69 KG/M2 | WEIGHT: 156.31 LBS

## 2025-07-23 DIAGNOSIS — Z01.419 ENCOUNTER FOR GYNECOLOGICAL EXAMINATION WITHOUT ABNORMAL FINDING: ICD-10-CM

## 2025-07-23 PROCEDURE — 77067 SCR MAMMO BI INCL CAD: CPT

## 2025-07-23 PROCEDURE — 77067 SCR MAMMO BI INCL CAD: CPT | Performed by: RADIOLOGY

## 2025-07-23 PROCEDURE — 77063 BREAST TOMOSYNTHESIS BI: CPT | Performed by: RADIOLOGY

## 2025-11-24 ENCOUNTER — APPOINTMENT (OUTPATIENT)
Dept: OBSTETRICS AND GYNECOLOGY | Facility: CLINIC | Age: 50
End: 2025-11-24
Payer: COMMERCIAL

## 2025-12-17 ENCOUNTER — APPOINTMENT (OUTPATIENT)
Dept: PRIMARY CARE | Facility: CLINIC | Age: 50
End: 2025-12-17
Payer: COMMERCIAL